# Patient Record
Sex: FEMALE
[De-identification: names, ages, dates, MRNs, and addresses within clinical notes are randomized per-mention and may not be internally consistent; named-entity substitution may affect disease eponyms.]

---

## 2018-05-22 ENCOUNTER — HOSPITAL ENCOUNTER (OUTPATIENT)
Dept: HOSPITAL 14 - H.OPSURG | Age: 38
Discharge: HOME | End: 2018-05-22
Attending: OBSTETRICS & GYNECOLOGY
Payer: COMMERCIAL

## 2018-05-22 VITALS — OXYGEN SATURATION: 95 %

## 2018-05-22 VITALS
HEART RATE: 94 BPM | SYSTOLIC BLOOD PRESSURE: 113 MMHG | TEMPERATURE: 97.6 F | RESPIRATION RATE: 18 BRPM | DIASTOLIC BLOOD PRESSURE: 75 MMHG

## 2018-05-22 VITALS — BODY MASS INDEX: 23.6 KG/M2

## 2018-05-22 DIAGNOSIS — N80.3: ICD-10-CM

## 2018-05-22 DIAGNOSIS — N80.1: ICD-10-CM

## 2018-05-22 DIAGNOSIS — N94.6: ICD-10-CM

## 2018-05-22 DIAGNOSIS — N80.5: ICD-10-CM

## 2018-05-22 DIAGNOSIS — N94.10: Primary | ICD-10-CM

## 2018-05-22 LAB
ERYTHROCYTE [DISTWIDTH] IN BLOOD BY AUTOMATED COUNT: 13.5 % (ref 11.5–14.5)
HGB BLD-MCNC: 12.7 G/DL (ref 12–16)
MCH RBC QN AUTO: 28.5 PG (ref 27–31)
MCHC RBC AUTO-ENTMCNC: 33.8 G/DL (ref 33–37)
MCV RBC AUTO: 84.1 FL (ref 81–99)
PLATELET # BLD: 246 K/UL (ref 130–400)
RBC # BLD AUTO: 4.45 MIL/UL (ref 3.8–5.2)
WBC # BLD AUTO: 4.8 K/UL (ref 4.8–10.8)

## 2018-05-22 PROCEDURE — 88305 TISSUE EXAM BY PATHOLOGIST: CPT

## 2018-05-22 PROCEDURE — 86850 RBC ANTIBODY SCREEN: CPT

## 2018-05-22 PROCEDURE — 86900 BLOOD TYPING SEROLOGIC ABO: CPT

## 2018-05-22 PROCEDURE — 58662 LAPAROSCOPY EXCISE LESIONS: CPT

## 2018-05-22 PROCEDURE — 45171 EXC RECT TUM TRANSANAL PART: CPT

## 2018-05-22 PROCEDURE — 85027 COMPLETE CBC AUTOMATED: CPT

## 2018-05-22 PROCEDURE — 58558 HYSTEROSCOPY BIOPSY: CPT

## 2018-05-22 PROCEDURE — 36415 COLL VENOUS BLD VENIPUNCTURE: CPT

## 2018-05-22 RX ADMIN — HYDROMORPHONE HYDROCHLORIDE PRN MG: 1 INJECTION, SOLUTION INTRAMUSCULAR; INTRAVENOUS; SUBCUTANEOUS at 16:28

## 2018-05-22 RX ADMIN — HYDROMORPHONE HYDROCHLORIDE PRN MG: 1 INJECTION, SOLUTION INTRAMUSCULAR; INTRAVENOUS; SUBCUTANEOUS at 16:12

## 2018-05-22 RX ADMIN — HYDROMORPHONE HYDROCHLORIDE PRN MG: 1 INJECTION, SOLUTION INTRAMUSCULAR; INTRAVENOUS; SUBCUTANEOUS at 17:02

## 2018-05-22 RX ADMIN — HYDROMORPHONE HYDROCHLORIDE PRN MG: 1 INJECTION, SOLUTION INTRAMUSCULAR; INTRAVENOUS; SUBCUTANEOUS at 16:44

## 2018-05-29 NOTE — PCM.OP
Operative Report





- Operative Report


Date of Surgery/Procedure: 05/22/18


Time of Surgery/Procedure: 10:00


Surgeon: Dr. Arnol Chen


Assistant: Dr. Mirza Stokes


Anesthesia/Sedation: general/Dr. Forrester


Pre-Operative Diagnosis: abdominal pain and endometriosis


Post-Operative Diagnosis: endometriosis with dusty-rectal involvement


Indication for Surgery: as above


Operative Findings: as above


Procedure/Operation Description: 1-Excision perirectal endometriosis (times two)

.  2-Colorrhaphy.  Brief History: This is a 38 year old woman brought to the 

operating room by Dr. Stokes where it was noted that she had perirectal 

involvement. Intraoperative consultation was obtained.  Description of the 

Procedure: Dr. Stokes had already initiated the robotic procedure (separate 

dictation Dr. Stokes). After taking control of the robotic console the first, 

most proximal, of the perirectal lesions was incised circufernetially and 

excixed using blunt and sharp dissection with the aid of electrocautery. The 

lesion was marked and sent to apthology separately. The second lesion was more 

distal and obviously deeper. This lesion was excided in a similar manner and 

snet to pathology separately. The distal rectal defect waas then clsoed with 

multiple interrupted 3-0 vicryl sutures. Hemostasis was deemed adequate. The 

operation was then turned over to Dr. Stokes (separate dictation Dr. Stokes).


Estimated Blood Loss: 5 cc


Complications: none


Specimen: perirectal lesions (times two)


Discharge & Condition: stable

## 2018-05-30 NOTE — OP
PROCEDURE DATE:  05/22/2018



PREOPERATIVE DIAGNOSES:  Dyspareunia, dysmenorrhea, history of

endometriosis, rule out recurrent endometriosis.



POSTOPERATIVE DIAGNOSIS:  Endometriosis stage III involving the ovary,

cul-de-sac, pelvic sidewalls and bladder.



PROCEDURE PERFORMED:  Cystoscopy with bilateral ureteral catheterization

and injection of IC-Green dye, diagnostic hysteroscopy, robotic da Antelmo

laparoscopy, left ovarian cystectomy, bilateral ureterolysis, and excision

of bladder mass.  Dr. Chen was consulted to excise some rectal lesions,

which were sent to Pathology, he will dictate separately.



SURGEON:  Mirza Stokes MD



ASSISTANT:  Arnol Chen MD from General Surgery.



COMPLICATIONS:  None.



SURGICAL SPECIMENS:  Include left ovarian cyst, left periureteral, right

uterosacral, left perirectal, posterior cervical perirectal, anterior

rectal, right periureteral, left perirectal, center bladder, anterior

bladder and round ligament endometriosis.



INDICATIONS FOR PROCEDURE:  The patient is a 38-year-old female who had

surgery six months prior for endometriosis.  Only a cystectomy was

performed at the time, but evidence of significant residual disease was

present.  The patient did not resolved any of her symptoms.  She was

counseled with regards to risk and benefit of a repeat operation with the

understanding that it will be more complicated given the prior surgery. 

She consented to the procedure and was taken to the OR.



DESCRIPTION OF PROCEDURE:  After adequate anesthesia was obtained, the

patient was placed in dorsal lithotomy position with extreme care to pad

every area prone to pressure and to make sure that her hips were not

hyperextended or hyperflexed.  A time-out was taken according to hospital

policy, and the patient was prepped and draped, the surgeon gowned and

gloved.  At this point, a cystoscope was inserted into the bladder

revealing a normal appearing bladder.  Both ureteral ostia were in a normal

anatomical position.  The left ureteral ostia was then cannulating

utilizing a 5-Norwegian open-ended catheter which was advanced all the way to

the distal ureter, and 5 mL of IC-Green was then injected.  At this point,

the same thing was performed on the right ureter where all the way down to

the distal ureter IC-Green was injected after cannulating with the stent. 

At this point, after removing the stents and a cystoscope, a 16-Norwegian

Myers was placed in to the bladder and attention was in the vaginal area

where a speculum was placed in the vagina.  The anterior lip of the cervix

was grasped.  The cervix was gently dilated and a hysteroscope was placed

into the uterus revealing a normal appearing cavity.  At this point,

attention was on the abdomen where an open laparoscopy performed according

to standard procedure.  The abdomen cavity was entered and cannula was

placed.  Under direct visualization, three additional ports were placed,

left upper quadrant, left mid quadrant, and right upper quadrant.  The JET[employer] Xi robot was then docked and brought into the field and the upper

abdomen was examined appeared to be normal and the procedure was started. 

There were findings of extensive endometriosis implant both on the bladder,

pelvic sidewall on the left, the left ovary was firmly attached to the left

pelvic sidewall with what appeared to be a small endometrioma, the

cul-de-sac appeared to have endometriosis on it including perirectal areas

as well as the right pelvic sidewall.  The procedure started on the left

hand side after identifying the ureter and the pelvic brim, the

retroperitoneal space was entered and a full dissection proceeded from the

pelvic brim all the way pointing towards the left ovarian fossa, a full

excision was performed elevating the ovary and dissecting the ureter.  At

this point after elevating the ovary, the ovary appeared to be distorted

and area of endometriosis was identified and it was excised and sent to

Pathology, this was a small endometrioma.  At this point, attention was on

the pelvic sidewall where a full dissection was performed with multiple

very large lesions which were affecting peritoneum on the left-hand side. 

As the dissection continued, it continue all the way to the ovarian fossa

dissecting of a significant amount of endometriosis,and

the right inflammatory lesion.  At this point, the excision continuing the

posterior aspect of the cervix where again a dissection was performed,

opening the rectovaginal space and dissecting off the posterior cervical

area.  Attention then was on the right-hand side where again the ovary was

elevated and the retroperitoneum was entered in a sharp fashion and a

progressive dissection was performed identifying the ureter, utilizing

fluorescent technology and then progressively dissecting off peritoneum

containing large amount of endometriosis, which was also dissected off. 

The right ovarian fossa also was quite effective with extensive implants,

which were also dissected off.  At this point, it was checked for

hemostasis and was appeared to be excellent, and attention was then on the

anterior aspect of the uterus where 3 large implants masses were on the

bladder were dissected off with great care not to enter the bladder and not

affecting the muscularis of the bladder and these 3 masses were dissected

and sent to Pathology.  The integrity of the bladder was checked by filling

the bladder.  An area on the round ligament containing endometriosis was

also dissected.  At this point, the consult was handed over to Dr. Chen

from General Surgery, who excised a perirectal mass of endometriosis which

was also sent off to Pathology.  Once this was done, he will dictate it

separately.  Once this was done, it was checked for hemostasis and appeared

to be excellent.  The pelvis was irrigated.  The da Antelmo Xi robot was then

docked.  The instruments were removed.  The abdomen desufflated.  The

incisions were closed with 0 PDS for the fascia and 4-0 Monocryl for the

skin.  At the end of the procedure, all tapes and instruments counts were

correct.  The patient tolerated the procedure well and was taken to the

recovery room in excellent condition.





__________________________________________

Mirza Stokes MD



DD:  05/29/2018 14:28:27

DT:  05/29/2018 22:43:03

Job # [id number]

[email or web address]